# Patient Record
Sex: FEMALE | Race: WHITE
[De-identification: names, ages, dates, MRNs, and addresses within clinical notes are randomized per-mention and may not be internally consistent; named-entity substitution may affect disease eponyms.]

---

## 2018-03-05 ENCOUNTER — HOSPITAL ENCOUNTER (OUTPATIENT)
Dept: HOSPITAL 17 - PHPRE | Age: 68
End: 2018-03-05
Attending: OPHTHALMOLOGY
Payer: MEDICARE

## 2018-03-05 DIAGNOSIS — R94.31: ICD-10-CM

## 2018-03-05 DIAGNOSIS — Z01.810: Primary | ICD-10-CM

## 2018-03-05 PROCEDURE — 93005 ELECTROCARDIOGRAM TRACING: CPT

## 2018-03-07 NOTE — EKG
Date Performed: 03/05/2018       Time Performed: 12:03:12

 

PTAGE:      67 years

 

EKG:      Sinus rhythm 

 

 POSSIBLE LEFT ATRIAL ENLARGEMENT BORDERLINE ECG

 

PREVIOUS TRACING       : 02/15/2008 07.44 Left atrial abnormality new since the prior tracing.

 

DOCTOR:   Duane Mattson  Interpretating Date/Time  03/07/2018 07:45:29

## 2018-03-07 NOTE — MH
cc:

Prasanna Hernandez MD

****

 

 

DATE OF ADMISSION:

03/19/2018

 

ADMISSION DIAGNOSIS:

Cataract left eye.

 

HISTORY OF PRESENT ILLNESS:

This 67-year-old white female is coming through [facility] for the purpose of a lens extraction of the left eye with 

intraocular lens implant under local anesthesia.  She has noted 

decreasing visual acuity interfering with her daily activities and has

elected to have the above procedure.  Her best corrected visual acuity

is 20/30 -1 in the right eye and 20/50 in the left eye.

 

PAST MEDICAL HISTORY:

The patient has a  history of -

1.  Autoimmune disease, possibly lupus.

2.  Osteoporosis.

3.  Breast cancer status post radiation and chemotherapy.

4.  Low tension glaucoma and takes timolol eye drops 0.5% twice per 

day in both eyes.

 

PAST SURGICAL HISTORY:

Positive for -

1.  Partial thyroidectomy and removal of parathyroid gland.

2.  Three lumpectomies on the right side.

3.  LASIK eye surgery in both eyes.

 

DAILY MEDICATIONS:

1.  Timolol.

2.  Gabapentin.

3.  Actonel.

4.  Multivitamins.

5.  Iron.

6.  Magnesium.

7.  81 mg of aspirin per day.

 

ALLERGIES:

BENADRYL.

SULFA.

VANCOMYCIN.

LIDOCAINE.

METHOTREXATE.

 

SOCIAL HISTORY:

The patient does not smoke and drinks socially wine a couple times per

week.

 

FAMILY HISTORY:

Positive for father with cataract and glaucoma.

 

REVIEW OF SYSTEMS:

HEAD:  Patient denies severe headaches, dizziness or recent head 

injury.

EARS:  Patient denies hearing loss, ear pain, discharge or ringing in 

the ears.

NOSE:  Patient denies nasal discharge, obstruction or frequent colds.

MOUTH AND THROAT:  Patient denies soreness of the mouth or tongue, 

bleeding gums, trouble swallowing, changes in voice or sore throat.

NECK:  She has some limitation of neck movement due to compressed 

discs in her neck.  Patient denies neck pain or swelling or neck 

injury.

CARDIOPULMONARY SYSTEM:  She has some chronic cough which she feels 

may be due to postnasal drip.  Patient denies shortness of breath, 

orthopnea, sputum production, hemoptysis, chest pain, wheezing, 

palpitations or light-headedness.

GI SYSTEM:  She has some diarrhea secondary to stress.  Patient denies

poor appetite, nausea, vomiting, abdominal pain, ulcers, hemorrhoids.

 SYSTEM:  She has urinary frequency, sometimes due to fluid intake. 

The patient denies dysuria, change in urine color.

NERVOUS SYSTEM:  Patient denies convulsions, vertigo, stroke, numbness

or weakness.

 

 

PHYSICAL EXAMINATION:

VITAL SIGNS:  Blood pressure is 118/76, pulse 84, respirations 20.

HEAD:  Normocephalic, atraumatic.

NOSE:  Without rhinorrhea.

THROAT:  Clear.

NECK:  Supple.

CHEST:  Clear.

HEART:  Regular rhythm.

ABDOMEN:  Without tenderness.

EXTREMITIES:  Without edema.

NEUROLOGIC:  Within normal limits.

MENTAL STATUS:  Within normal limits.

 

EYE EXAM:

The patient's best corrected visual acuity 20/20 -1 in the right eye 

and 20/50 -1 in the left eye.  Visual fields are full to confrontation

testing.  Extraocular muscle exam reveals full versions with exophoria

in the distance and exotropia at near.  Pupils are 3.5 mm, equal, 

round, and reactive to light without afferent defect.  The anterior 

segment examination reveals nuclear sclerotic, cortical and posterior 

subcapsular cataract present in the left eye with nuclear sclerotic 

and cortical changes in the right eye.  Intraocular pressure is 12 in 

the right eye and 14 in the left by applanation tonometry.  Dilated 

fundus examination reveals sharp disks with cup-to-disk ratio of 0.45 

in the right eye and 0.8 in the left.  The macula is clear 

bilaterally.  A posterior vitreous detachment is present bilaterally. 

There is some pigment nasal to the disk that is 2.5 disk diameters in 

size in the right eye and some lattice degeneration is noted 

superiorly in the right eye.

 

IMPRESSION:

1.  Bilateral cataracts left greater than right.

2.  Normal tension glaucoma, mild in the right eye, severe in the 

left.

3.  Choroidal nevus right eye.

4.  Lattice degeneration right eye.

5.  Status post LASIK surgery bilateral.

 

PLAN:

Lens extraction of the left eye with intraocular lens implant under 

local anesthesia through [facility].  The patient has 

been cleared medically.  She has been counseled as to the risks, 

benefits and alternatives and elected to proceed.  I feel that 

cataract surgery will improve the quality of life and activities of 

daily living in this patient.

 

 

__________________________________

MD GUILLAUME Shah/MARK

D: 03/07/2018, 12:54 PM

T: 03/07/2018, 01:21 PM

Visit #: [id number]

Job #: [id number]

## 2018-03-19 ENCOUNTER — HOSPITAL ENCOUNTER (OUTPATIENT)
Dept: HOSPITAL 17 - PHSDC | Age: 68
Discharge: HOME | End: 2018-03-19
Attending: OPHTHALMOLOGY
Payer: MEDICARE

## 2018-03-19 VITALS
DIASTOLIC BLOOD PRESSURE: 68 MMHG | SYSTOLIC BLOOD PRESSURE: 136 MMHG | OXYGEN SATURATION: 98 % | HEART RATE: 67 BPM | RESPIRATION RATE: 14 BRPM

## 2018-03-19 VITALS — HEART RATE: 58 BPM

## 2018-03-19 VITALS — HEIGHT: 64 IN | BODY MASS INDEX: 19.76 KG/M2 | WEIGHT: 115.74 LBS

## 2018-03-19 VITALS — TEMPERATURE: 97.9 F

## 2018-03-19 VITALS — HEART RATE: 65 BPM

## 2018-03-19 DIAGNOSIS — H26.9: Primary | ICD-10-CM

## 2018-03-19 PROCEDURE — 66984 XCAPSL CTRC RMVL W/O ECP: CPT

## 2018-03-19 PROCEDURE — V2632 POST CHMBR INTRAOCULAR LENS: HCPCS

## 2018-03-19 PROCEDURE — 00142 ANES PX ON EYE LENS SURGERY: CPT

## 2018-03-19 RX ADMIN — CYCLOPENTOLATE HYDROCHLORIDE SCH DROP: 10 SOLUTION/ DROPS OPHTHALMIC at 07:51

## 2018-03-19 RX ADMIN — GATIFLOXACIN SCH DROP: 5 SOLUTION/ DROPS OPHTHALMIC at 07:48

## 2018-03-19 RX ADMIN — DICLOFENAC SODIUM SCH DROP: 1 SOLUTION/ DROPS OPHTHALMIC at 07:45

## 2018-03-19 RX ADMIN — DICLOFENAC SODIUM SCH DROP: 1 SOLUTION/ DROPS OPHTHALMIC at 07:54

## 2018-03-19 RX ADMIN — PHENYLEPHRINE HYDROCHLORIDE SCH DROP: 25 SOLUTION/ DROPS OPHTHALMIC at 07:45

## 2018-03-19 RX ADMIN — TROPICAMIDE SCH DROP: 10 SOLUTION/ DROPS OPHTHALMIC at 07:54

## 2018-03-19 RX ADMIN — TROPICAMIDE SCH DROP: 10 SOLUTION/ DROPS OPHTHALMIC at 07:51

## 2018-03-19 RX ADMIN — DICLOFENAC SODIUM SCH DROP: 1 SOLUTION/ DROPS OPHTHALMIC at 07:48

## 2018-03-19 RX ADMIN — CYCLOPENTOLATE HYDROCHLORIDE SCH DROP: 10 SOLUTION/ DROPS OPHTHALMIC at 07:54

## 2018-03-19 RX ADMIN — PHENYLEPHRINE HYDROCHLORIDE SCH DROP: 25 SOLUTION/ DROPS OPHTHALMIC at 07:54

## 2018-03-19 RX ADMIN — GATIFLOXACIN SCH DROP: 5 SOLUTION/ DROPS OPHTHALMIC at 07:45

## 2018-03-19 RX ADMIN — GATIFLOXACIN SCH DROP: 5 SOLUTION/ DROPS OPHTHALMIC at 07:51

## 2018-03-19 RX ADMIN — TROPICAMIDE SCH DROP: 10 SOLUTION/ DROPS OPHTHALMIC at 07:48

## 2018-03-19 RX ADMIN — PHENYLEPHRINE HYDROCHLORIDE SCH DROP: 25 SOLUTION/ DROPS OPHTHALMIC at 07:48

## 2018-03-19 RX ADMIN — CYCLOPENTOLATE HYDROCHLORIDE SCH DROP: 10 SOLUTION/ DROPS OPHTHALMIC at 07:48

## 2018-03-19 RX ADMIN — PHENYLEPHRINE HYDROCHLORIDE SCH DROP: 25 SOLUTION/ DROPS OPHTHALMIC at 07:51

## 2018-03-19 RX ADMIN — TROPICAMIDE SCH DROP: 10 SOLUTION/ DROPS OPHTHALMIC at 07:45

## 2018-03-19 RX ADMIN — CYCLOPENTOLATE HYDROCHLORIDE SCH DROP: 10 SOLUTION/ DROPS OPHTHALMIC at 07:45

## 2018-03-19 RX ADMIN — GATIFLOXACIN SCH DROP: 5 SOLUTION/ DROPS OPHTHALMIC at 07:54

## 2018-03-19 RX ADMIN — DICLOFENAC SODIUM SCH DROP: 1 SOLUTION/ DROPS OPHTHALMIC at 07:51

## 2018-03-19 NOTE — MP
cc:

Prasanna Hernandez MD

****

 

 

DATE OF OPERATION:

03/19/2018

 

POSTOPERATIVE DIAGNOSIS:

Cataract left eye.

 

OPERATION:

Extracapsular cataract extraction with posterior chamber intraocular 

lens implant by phacoemulsification, left eye.

 

SURGEON:

Prasanna Hernandez M.D.

 

ANESTHESIA:

Local.

 

COMPLICATIONS:

None.

 

INDICATIONS:

See history and physical previously dictated.

 

OPERATIVE PROCEDURE:

The patient had adequate retrobulbar and eyelid blocks administered in

the holding area and was brought to the operating room.  The left eye 

was prepped and draped in the usual sterile ophthalmic manner.  A lid 

speculum was inserted in the left eye.  A 4-0 silk bridle suture was 

placed through the conjunctiva near the superior rectus muscle and it 

was tagged to the drape.  A fornix-based conjunctival flap was 

prepared spanning approximately 5 mm in width.  Hemostasis was 

obtained with wet-field cautery.  A 3.5 mm groove was made 1 mm from 

the limbus and dissected up to the limbus in the form of a scleral 

pocket incision.  A stab incision was then made at the 2 o'clock 

position.  Viscoelastic was injected into the anterior chamber.  The 

anterior chamber was entered with a 2.75 mm keratome through the 

scleral pocket incision.  A 360 degree continuous curvilinear 

capsulorrhexis was then performed.  Hydrodissection was utilized to 

divide the nucleus into inner and outer components and to separate the

cortex from the capsule.  Phacoemulsification was then utilized to 

remove the nucleus.  The outer nuclear layer was removed with 

irrigation and aspiration and short bursts of ultrasound as necessary.

 The cortex was removed with the irrigation/aspiration handpiece.  The

posterior capsule was polished with the capsule polisher.  

Viscoelastic was injected into the capsular bag.  The intraocular lens

was inspected and found to be in good condition.  The lens utilized 

was a Tom, model number SA60AT with a power of +19.5 diopters.  The 

lens was inserted into the capsular bag.  The viscoelastic in the 

anterior chamber was then removed with the irrigation-aspiration 

handpiece.

Viscoelastic was also removed from beneath the intraocular lens.  The 

anterior chamber was filled with Miochol-E through the stab incision 

and pressurized.  The wound was checked for leaks at this pressure and

normalized pressure and there were none.  The 4-0 bridle suture was 

removed.  The conjunctival flap was brought down over the wound and 

secured with cautery.  Pilocarpine 2% eye drops were instilled 

topically.  The lid speculum was removed.  TobraDex ophthalmic 

ointment was applied.  The eye was double patched and shielded.  The 

patient tolerated the procedure well and left the Operating Room in 

satisfactory condition.

 

 

__________________________________

PrasannaMD GUILLAUME [place]/MADELIN

D: 03/19/2018, 10:08 AM

T: 03/19/2018, 10:30 AM

Visit #: [id number]

Job #: [id number]